# Patient Record
Sex: MALE | Race: OTHER | Employment: PART TIME | URBAN - METROPOLITAN AREA
[De-identification: names, ages, dates, MRNs, and addresses within clinical notes are randomized per-mention and may not be internally consistent; named-entity substitution may affect disease eponyms.]

---

## 2018-09-14 LAB
AVERAGE GLUCOSE: NORMAL
CHOLESTEROL, TOTAL: 167 MG/DL
CHOLESTEROL/HDL RATIO: NORMAL
CREATININE: 0.9 MG/DL
HBA1C MFR BLD: 5.4 %
HDLC SERPL-MCNC: 53 MG/DL (ref 35–70)
LDL CHOLESTEROL CALCULATED: 102 MG/DL (ref 0–160)
POTASSIUM (K+): 4.6
PROSTATE SPECIFIC ANTIGEN: 0.69 NG/ML
TRIGL SERPL-MCNC: 62 MG/DL
VLDLC SERPL CALC-MCNC: 12 MG/DL

## 2019-10-04 LAB
AVERAGE GLUCOSE: NORMAL
CHOLESTEROL, TOTAL: 180 MG/DL
CHOLESTEROL/HDL RATIO: NORMAL
CREATININE: 1 MG/DL
HBA1C MFR BLD: 5.4 %
HDLC SERPL-MCNC: 58 MG/DL (ref 35–70)
LDL CHOLESTEROL CALCULATED: 107 MG/DL (ref 0–160)
POTASSIUM (K+): 4.4
PROSTATE SPECIFIC ANTIGEN: 0.8 NG/ML
TRIGL SERPL-MCNC: 75 MG/DL
VLDLC SERPL CALC-MCNC: 15 MG/DL

## 2020-05-27 VITALS
HEART RATE: 58 BPM | HEIGHT: 66 IN | DIASTOLIC BLOOD PRESSURE: 64 MMHG | WEIGHT: 151 LBS | BODY MASS INDEX: 24.27 KG/M2 | SYSTOLIC BLOOD PRESSURE: 116 MMHG | RESPIRATION RATE: 14 BRPM

## 2020-05-27 RX ORDER — ROSUVASTATIN CALCIUM 5 MG/1
5 TABLET, COATED ORAL DAILY
COMMUNITY
End: 2021-01-12 | Stop reason: SDUPTHER

## 2020-05-27 RX ORDER — OMEPRAZOLE 40 MG/1
40 CAPSULE, DELAYED RELEASE ORAL DAILY
COMMUNITY
End: 2021-12-03 | Stop reason: SDUPTHER

## 2020-05-27 RX ORDER — TAMSULOSIN HYDROCHLORIDE 0.4 MG/1
0.4 CAPSULE ORAL DAILY
COMMUNITY
End: 2021-02-22 | Stop reason: SDUPTHER

## 2020-05-27 RX ORDER — ASPIRIN 81 MG/1
81 TABLET ORAL DAILY
COMMUNITY
End: 2021-01-20 | Stop reason: SDUPTHER

## 2020-10-21 ENCOUNTER — HOSPITAL ENCOUNTER (OUTPATIENT)
Age: 70
Discharge: HOME | End: 2020-10-23
Payer: COMMERCIAL

## 2020-10-21 LAB
ALBUMIN SERPL-MCNC: 4.2 G/DL (ref 3.5–5.2)
ALP BLD-CCNC: 88 U/L (ref 40–129)
ALT SERPL-CCNC: 21 U/L (ref 0–40)
ANION GAP SERPL CALCULATED.3IONS-SCNC: 10 MMOL/L (ref 7–16)
AST SERPL-CCNC: 26 U/L (ref 0–39)
BASOPHILS ABSOLUTE: 0.05 E9/L (ref 0–0.2)
BASOPHILS RELATIVE PERCENT: 1.1 % (ref 0–2)
BILIRUB SERPL-MCNC: 0.7 MG/DL (ref 0–1.2)
BUN BLDV-MCNC: 14 MG/DL (ref 8–23)
CALCIUM SERPL-MCNC: 9.8 MG/DL (ref 8.6–10.2)
CHLORIDE BLD-SCNC: 107 MMOL/L (ref 98–107)
CHOLESTEROL, TOTAL: 144 MG/DL (ref 0–199)
CO2: 26 MMOL/L (ref 22–29)
CREAT SERPL-MCNC: 1 MG/DL (ref 0.7–1.2)
EOSINOPHILS ABSOLUTE: 0.11 E9/L (ref 0.05–0.5)
EOSINOPHILS RELATIVE PERCENT: 2.4 % (ref 0–6)
GFR AFRICAN AMERICAN: >60
GFR NON-AFRICAN AMERICAN: >60 ML/MIN/1.73
GLUCOSE BLD-MCNC: 92 MG/DL (ref 74–99)
HBA1C MFR BLD: 5.2 % (ref 4–5.6)
HCT VFR BLD CALC: 44.4 % (ref 37–54)
HDLC SERPL-MCNC: 61 MG/DL
HEMOGLOBIN: 14.4 G/DL (ref 12.5–16.5)
IMMATURE GRANULOCYTES #: 0.01 E9/L
IMMATURE GRANULOCYTES %: 0.2 % (ref 0–5)
LDL CHOLESTEROL CALCULATED: 71 MG/DL (ref 0–99)
LYMPHOCYTES ABSOLUTE: 2 E9/L (ref 1.5–4)
LYMPHOCYTES RELATIVE PERCENT: 43.6 % (ref 20–42)
MCH RBC QN AUTO: 30.6 PG (ref 26–35)
MCHC RBC AUTO-ENTMCNC: 32.4 % (ref 32–34.5)
MCV RBC AUTO: 94.3 FL (ref 80–99.9)
MONOCYTES ABSOLUTE: 0.35 E9/L (ref 0.1–0.95)
MONOCYTES RELATIVE PERCENT: 7.6 % (ref 2–12)
NEUTROPHILS ABSOLUTE: 2.07 E9/L (ref 1.8–7.3)
NEUTROPHILS RELATIVE PERCENT: 45.1 % (ref 43–80)
PDW BLD-RTO: 12.8 FL (ref 11.5–15)
PLATELET # BLD: 177 E9/L (ref 130–450)
PMV BLD AUTO: 11.9 FL (ref 7–12)
POTASSIUM SERPL-SCNC: 5.4 MMOL/L (ref 3.5–5)
PROSTATE SPECIFIC ANTIGEN: 0.86 NG/ML (ref 0–4)
RBC # BLD: 4.71 E12/L (ref 3.8–5.8)
SODIUM BLD-SCNC: 143 MMOL/L (ref 132–146)
TOTAL PROTEIN: 6.7 G/DL (ref 6.4–8.3)
TRIGL SERPL-MCNC: 59 MG/DL (ref 0–149)
TSH SERPL DL<=0.05 MIU/L-ACNC: 3.96 UIU/ML (ref 0.27–4.2)
VLDLC SERPL CALC-MCNC: 12 MG/DL
WBC # BLD: 4.6 E9/L (ref 4.5–11.5)

## 2020-10-21 PROCEDURE — 80053 COMPREHEN METABOLIC PANEL: CPT

## 2020-10-21 PROCEDURE — 84443 ASSAY THYROID STIM HORMONE: CPT

## 2020-10-21 PROCEDURE — 36415 COLL VENOUS BLD VENIPUNCTURE: CPT

## 2020-10-21 PROCEDURE — G0103 PSA SCREENING: HCPCS

## 2020-10-21 PROCEDURE — 80061 LIPID PANEL: CPT

## 2020-10-21 PROCEDURE — 85025 COMPLETE CBC W/AUTO DIFF WBC: CPT

## 2020-10-21 PROCEDURE — 83036 HEMOGLOBIN GLYCOSYLATED A1C: CPT

## 2020-10-22 VITALS
RESPIRATION RATE: 14 BRPM | HEART RATE: 56 BPM | BODY MASS INDEX: 23.89 KG/M2 | DIASTOLIC BLOOD PRESSURE: 76 MMHG | TEMPERATURE: 96 F | SYSTOLIC BLOOD PRESSURE: 118 MMHG | WEIGHT: 148 LBS

## 2020-10-26 VITALS
BODY MASS INDEX: 23.78 KG/M2 | DIASTOLIC BLOOD PRESSURE: 76 MMHG | TEMPERATURE: 96 F | RESPIRATION RATE: 14 BRPM | HEART RATE: 56 BPM | HEIGHT: 66 IN | WEIGHT: 148 LBS | SYSTOLIC BLOOD PRESSURE: 118 MMHG

## 2021-01-12 RX ORDER — ROSUVASTATIN CALCIUM 5 MG/1
5 TABLET, COATED ORAL DAILY
Qty: 90 TABLET | Refills: 1 | Status: SHIPPED
Start: 2021-01-12 | End: 2021-06-21 | Stop reason: SDUPTHER

## 2021-01-21 RX ORDER — ASPIRIN 81 MG/1
81 TABLET ORAL DAILY
Qty: 90 TABLET | Refills: 3 | Status: SHIPPED | OUTPATIENT
Start: 2021-01-21

## 2021-02-22 RX ORDER — TAMSULOSIN HYDROCHLORIDE 0.4 MG/1
0.4 CAPSULE ORAL DAILY
Qty: 90 CAPSULE | Refills: 1 | Status: SHIPPED
Start: 2021-02-22 | End: 2021-06-21 | Stop reason: SDUPTHER

## 2021-06-21 RX ORDER — HYDROXYZINE HYDROCHLORIDE 25 MG/1
25 TABLET, FILM COATED ORAL EVERY 8 HOURS PRN
Qty: 60 TABLET | Refills: 0 | Status: SHIPPED | OUTPATIENT
Start: 2021-06-21 | End: 2021-07-21

## 2021-06-21 RX ORDER — ONDANSETRON 4 MG/1
4 TABLET, ORALLY DISINTEGRATING ORAL 3 TIMES DAILY PRN
Qty: 60 TABLET | Refills: 0 | Status: SHIPPED | OUTPATIENT
Start: 2021-06-21 | End: 2021-07-21

## 2021-06-21 RX ORDER — MELOXICAM 15 MG/1
15 TABLET ORAL DAILY
Qty: 90 TABLET | Refills: 1 | Status: SHIPPED
Start: 2021-06-21 | End: 2021-12-03 | Stop reason: SDUPTHER

## 2021-06-21 RX ORDER — TAMSULOSIN HYDROCHLORIDE 0.4 MG/1
0.4 CAPSULE ORAL DAILY
Qty: 90 CAPSULE | Refills: 1 | Status: SHIPPED
Start: 2021-06-21 | End: 2021-12-03 | Stop reason: SDUPTHER

## 2021-06-21 RX ORDER — ROSUVASTATIN CALCIUM 5 MG/1
5 TABLET, COATED ORAL DAILY
Qty: 90 TABLET | Refills: 1 | Status: SHIPPED
Start: 2021-06-21 | End: 2021-12-03 | Stop reason: SDUPTHER

## 2021-09-29 ENCOUNTER — TELEPHONE (OUTPATIENT)
Dept: FAMILY MEDICINE CLINIC | Age: 71
End: 2021-09-29

## 2021-09-29 DIAGNOSIS — E78.5 HYPERLIPIDEMIA, UNSPECIFIED HYPERLIPIDEMIA TYPE: Primary | ICD-10-CM

## 2021-09-29 DIAGNOSIS — Z86.2 HISTORY OF HENOCH-SCHONLEIN PURPURA: ICD-10-CM

## 2021-09-29 DIAGNOSIS — N40.1 BENIGN PROSTATIC HYPERPLASIA WITH LOWER URINARY TRACT SYMPTOMS, SYMPTOM DETAILS UNSPECIFIED: ICD-10-CM

## 2021-09-29 DIAGNOSIS — Z12.5 PROSTATE CANCER SCREENING: ICD-10-CM

## 2021-09-29 DIAGNOSIS — R53.83 OTHER FATIGUE: ICD-10-CM

## 2021-09-29 DIAGNOSIS — R73.9 HYPERGLYCEMIA: ICD-10-CM

## 2021-09-29 DIAGNOSIS — M15.9 GENERALIZED OSTEOARTHRITIS: ICD-10-CM

## 2021-09-29 DIAGNOSIS — K64.9 HEMORRHOIDS, UNSPECIFIED HEMORRHOID TYPE: ICD-10-CM

## 2021-12-03 ENCOUNTER — OFFICE VISIT (OUTPATIENT)
Dept: FAMILY MEDICINE CLINIC | Age: 71
End: 2021-12-03
Payer: COMMERCIAL

## 2021-12-03 VITALS
SYSTOLIC BLOOD PRESSURE: 116 MMHG | WEIGHT: 146 LBS | HEIGHT: 66 IN | TEMPERATURE: 96 F | OXYGEN SATURATION: 99 % | HEART RATE: 61 BPM | DIASTOLIC BLOOD PRESSURE: 76 MMHG | BODY MASS INDEX: 23.46 KG/M2

## 2021-12-03 DIAGNOSIS — M15.9 GENERALIZED OA: ICD-10-CM

## 2021-12-03 DIAGNOSIS — Z12.5 PROSTATE CANCER SCREENING: ICD-10-CM

## 2021-12-03 DIAGNOSIS — N40.0 BENIGN PROSTATIC HYPERPLASIA WITHOUT LOWER URINARY TRACT SYMPTOMS: ICD-10-CM

## 2021-12-03 DIAGNOSIS — R53.83 OTHER FATIGUE: ICD-10-CM

## 2021-12-03 DIAGNOSIS — K21.9 GASTROESOPHAGEAL REFLUX DISEASE WITHOUT ESOPHAGITIS: ICD-10-CM

## 2021-12-03 DIAGNOSIS — Z00.00 ANNUAL PHYSICAL EXAM: Primary | ICD-10-CM

## 2021-12-03 DIAGNOSIS — E55.9 VITAMIN D DEFICIENCY: ICD-10-CM

## 2021-12-03 DIAGNOSIS — E83.42 HYPOMAGNESEMIA: ICD-10-CM

## 2021-12-03 DIAGNOSIS — E78.5 HYPERLIPIDEMIA, UNSPECIFIED HYPERLIPIDEMIA TYPE: ICD-10-CM

## 2021-12-03 DIAGNOSIS — R73.9 HYPERGLYCEMIA: ICD-10-CM

## 2021-12-03 DIAGNOSIS — M17.12 OSTEOARTHRITIS OF LEFT KNEE, UNSPECIFIED OSTEOARTHRITIS TYPE: ICD-10-CM

## 2021-12-03 PROCEDURE — G8484 FLU IMMUNIZE NO ADMIN: HCPCS | Performed by: INTERNAL MEDICINE

## 2021-12-03 PROCEDURE — 99397 PER PM REEVAL EST PAT 65+ YR: CPT | Performed by: INTERNAL MEDICINE

## 2021-12-03 PROCEDURE — 93000 ELECTROCARDIOGRAM COMPLETE: CPT | Performed by: INTERNAL MEDICINE

## 2021-12-03 RX ORDER — OMEPRAZOLE 40 MG/1
40 CAPSULE, DELAYED RELEASE ORAL DAILY
Qty: 90 CAPSULE | Refills: 1 | Status: SHIPPED | OUTPATIENT
Start: 2021-12-03

## 2021-12-03 RX ORDER — MELOXICAM 15 MG/1
15 TABLET ORAL DAILY
Qty: 90 TABLET | Refills: 1 | Status: SHIPPED | OUTPATIENT
Start: 2021-12-03

## 2021-12-03 RX ORDER — ROSUVASTATIN CALCIUM 5 MG/1
5 TABLET, COATED ORAL DAILY
Qty: 90 TABLET | Refills: 1 | Status: SHIPPED | OUTPATIENT
Start: 2021-12-03 | End: 2022-05-13 | Stop reason: SDUPTHER

## 2021-12-03 RX ORDER — TAMSULOSIN HYDROCHLORIDE 0.4 MG/1
0.4 CAPSULE ORAL DAILY
Qty: 90 CAPSULE | Refills: 1 | Status: SHIPPED | OUTPATIENT
Start: 2021-12-03

## 2021-12-03 SDOH — ECONOMIC STABILITY: FOOD INSECURITY: WITHIN THE PAST 12 MONTHS, THE FOOD YOU BOUGHT JUST DIDN'T LAST AND YOU DIDN'T HAVE MONEY TO GET MORE.: NEVER TRUE

## 2021-12-03 SDOH — ECONOMIC STABILITY: FOOD INSECURITY: WITHIN THE PAST 12 MONTHS, YOU WORRIED THAT YOUR FOOD WOULD RUN OUT BEFORE YOU GOT MONEY TO BUY MORE.: NEVER TRUE

## 2021-12-03 ASSESSMENT — PATIENT HEALTH QUESTIONNAIRE - PHQ9
SUM OF ALL RESPONSES TO PHQ QUESTIONS 1-9: 0
SUM OF ALL RESPONSES TO PHQ9 QUESTIONS 1 & 2: 0
1. LITTLE INTEREST OR PLEASURE IN DOING THINGS: 0
SUM OF ALL RESPONSES TO PHQ QUESTIONS 1-9: 0
2. FEELING DOWN, DEPRESSED OR HOPELESS: 0
SUM OF ALL RESPONSES TO PHQ QUESTIONS 1-9: 0

## 2021-12-03 ASSESSMENT — SOCIAL DETERMINANTS OF HEALTH (SDOH): HOW HARD IS IT FOR YOU TO PAY FOR THE VERY BASICS LIKE FOOD, HOUSING, MEDICAL CARE, AND HEATING?: NOT HARD AT ALL

## 2021-12-03 NOTE — PROGRESS NOTES
Subjective:     Chief Complaint   Patient presents with    Annual Exam   Patient is here for and will examination    Complains of occasional flickering of the right upper eyelid lasting for 5 to 10 seconds, happens 2-3 times a day,  For the past 4 to 5 months, denies any other symptoms, he was seen by optometrist then also seen by ophthalmologist Dr. Errol Fleming he did not recommend any further testing, patient was told it could be a benign condition    Patient denies any tingling numbness on the face, denies any blurred vision, denies any tingling numbness on the arms or legs, denies any loss of balance, denies any headache or blurred vision,  Overall feels very well,    Complains of pain in the left knee he was walking up the hill it happened after that is getting better now,    Follow-up on the cholesterol and the BPH doing better with the Crestor and Flomax    Patient takes meloxicam on and off    History of Henoch-Schönlein purpura in 2018 with no recurrence, he was treated with prednisone by rheumatologist  Stress test August 2017 -    Past Medical History:   Diagnosis Date    Arthritis     GERD (gastroesophageal reflux disease)     Hemorrhoids     History of cardiovascular stress test 08/15/2017    Nuclear treadmill stress test    Hyperlipidemia     Irritable bowel syndrome     Plantar fasciitis         Social History     Socioeconomic History    Marital status:      Spouse name: Not on file    Number of children: Not on file    Years of education: Not on file    Highest education level: Not on file   Occupational History    Not on file   Tobacco Use    Smoking status: Never Smoker    Smokeless tobacco: Never Used   Substance and Sexual Activity    Alcohol use: No    Drug use: No    Sexual activity: Not on file   Other Topics Concern    Not on file   Social History Narrative    Not on file     Social Determinants of Health     Financial Resource Strain: Low Risk     Difficulty of Paying Living Expenses: Not hard at all   Food Insecurity: No Food Insecurity    Worried About [facility] in the Last Year: Never true    Ran Out of Food in the Last Year: Never true   Transportation Needs:     Lack of Transportation (Medical): Not on file    Lack of Transportation (Non-Medical): Not on file   Physical Activity:     Days of Exercise per Week: Not on file    Minutes of Exercise per Session: Not on file   Stress:     Feeling of Stress : Not on file   Social Connections:     Frequency of Communication with Friends and Family: Not on file    Frequency of Social Gatherings with Friends and Family: Not on file    Attends Hinduism Services: Not on file    Active Member of [address] [place] [address] or Organizations: Not on file    Attends Club or Organization Meetings: Not on file    Marital Status: Not on file   Intimate Partner Violence:     Fear of Current or Ex-Partner: Not on file    Emotionally Abused: Not on file    Physically Abused: Not on file    Sexually Abused: Not on file   Housing Stability:     Unable to Pay for Housing in the Last Year: Not on file    Number of [place] in the Last Year: Not on file    Unstable Housing in the Last Year: Not on file        Past Surgical History:   Procedure Laterality Date    ANUS SURGERY          Family History   Problem Relation Age of Onset    Cancer Father         melanoma     No Known Problems Brother         Allergies   Allergen Reactions    Asa [Aspirin]     Crestor [Rosuvastatin Calcium]     Iv Dye [Iodides]         ROS  No acute distress  Cardiac: Denies any chest pain or palpitation  Stress test 2017 -  He does stationary bike, and exercise 3-4 times a week without any angina or dyspnea, he can walk for half an hour without difficulty  He does elliptical for half an hour without difficult  Respiratory: Denies any cough or shortness of breath  CT chest August 2017 -ve  GI: No abdominal pain.  Denies any nausea vomiting or diarrhea  CT abdomen pelvis August 2017 -  Had colonoscopy with Dr. Coy Blunt 2020  : Denies any dysuria frequency or hematuria  Renal ultrasound October 2020 was negative  Neuro: No headache or dizziness  Chronic weakness right arm and right leg since childhood has been evaluated by different physicians  He was seen by neurosurgeon in [place] many years ago  Condition is stable  Endocrine: No diabetes  Skin: normal  No recent weight gain or weight loss  Denies any change in vision    Objective:    /76   Pulse 61   Temp 96 °F (35.6 °C)   Ht 5' 6\" (1.676 m)   Wt 146 lb (66.2 kg)   SpO2 99%   BMI 23.57 kg/m²     Constitutional: Alert awake and oriented  Eyes: Pupils equal bilaterally. Extraocular muscles intact  Neck: no JVD adenopathy no bruit  Heart:  RRR, no murmurs, gallops, or rubs.   Lungs:    no wheeze, rales or rhonchi  Abd: bowel sounds present, nontender, nondistended, no masses  Extrem:  No clubbing, cyanosis, or edema  Neuro: AAOx3,No Focal deficit  Pupils equal bilaterally  Extraocular muscles intact  Cranial nerves II through XII intact  No motor or sensory deficit except chronic weakness right arm and right leg  Gait normal,  Reflexes normal,      Psychological: no depression or anxiety   Left knee exam no localized tenderness  No swelling  No calf tenderness    Rectal exam prostate normal stool Hemoccult negative    Current Outpatient Medications   Medication Sig Dispense Refill    meloxicam (MOBIC) 15 MG tablet Take 1 tablet by mouth daily 90 tablet 1    rosuvastatin (CRESTOR) 5 MG tablet Take 1 tablet by mouth daily 90 tablet 1    tamsulosin (FLOMAX) 0.4 MG capsule Take 1 capsule by mouth daily 90 capsule 1    omeprazole (PRILOSEC) 40 MG delayed release capsule Take 1 capsule by mouth daily 90 capsule 1    aspirin 81 MG EC tablet Take 1 tablet by mouth daily 90 tablet 3    Coenzyme Q10 (CO Q10) 100 MG CAPS Take 200 mg by mouth daily       No current facility-administered medications for this visit.         Last 3 BMP  Lab Results   Component Value Date/Time     10/21/2020 07:30 AM     11/14/2017 12:55 PM     08/14/2017 10:26 PM    K 5.4 (H) 10/21/2020 07:30 AM    K 4.4 10/04/2019 12:00 AM    K 4.6 09/14/2018 12:00 AM    K 4.1 11/14/2017 12:55 PM    K 4.6 08/14/2017 10:26 PM     10/21/2020 07:30 AM    CL 98 11/14/2017 12:55 PM     08/14/2017 10:26 PM    CO2 26 10/21/2020 07:30 AM    CO2 29 11/14/2017 12:55 PM    CO2 30 (H) 08/14/2017 10:26 PM    BUN 14 10/21/2020 07:30 AM    BUN 11 11/14/2017 12:55 PM    BUN 16 08/14/2017 10:26 PM    CREATININE 1.0 10/21/2020 07:30 AM    CREATININE 1.0 10/04/2019 12:00 AM    CREATININE 0.9 09/14/2018 12:00 AM    CREATININE 1.0 11/14/2017 12:55 PM    CREATININE 1.0 08/14/2017 10:26 PM    GLUCOSE 92 10/21/2020 07:30 AM    GLUCOSE 88 11/14/2017 12:55 PM    GLUCOSE 118 (H) 08/14/2017 10:26 PM    GLUCOSE 86 11/19/2010 09:23 AM    CALCIUM 9.8 10/21/2020 07:30 AM    CALCIUM 10.1 11/14/2017 12:55 PM    CALCIUM 9.3 08/14/2017 10:26 PM       Last 3 CMP:    Lab Results   Component Value Date/Time     10/21/2020 07:30 AM     11/14/2017 12:55 PM     08/14/2017 10:26 PM    K 5.4 (H) 10/21/2020 07:30 AM    K 4.4 10/04/2019 12:00 AM    K 4.6 09/14/2018 12:00 AM    K 4.1 11/14/2017 12:55 PM    K 4.6 08/14/2017 10:26 PM     10/21/2020 07:30 AM    CL 98 11/14/2017 12:55 PM     08/14/2017 10:26 PM    CO2 26 10/21/2020 07:30 AM    CO2 29 11/14/2017 12:55 PM    CO2 30 (H) 08/14/2017 10:26 PM    BUN 14 10/21/2020 07:30 AM    BUN 11 11/14/2017 12:55 PM    BUN 16 08/14/2017 10:26 PM    CREATININE 1.0 10/21/2020 07:30 AM    CREATININE 1.0 10/04/2019 12:00 AM    CREATININE 0.9 09/14/2018 12:00 AM    CREATININE 1.0 11/14/2017 12:55 PM    CREATININE 1.0 08/14/2017 10:26 PM    GLUCOSE 92 10/21/2020 07:30 AM    GLUCOSE 88 11/14/2017 12:55 PM    GLUCOSE 118 (H) 08/14/2017 10:26 PM    GLUCOSE 86 11/19/2010 09:23 AM    CALCIUM 9.8 10/21/2020 07:30 AM    CALCIUM 10.1 11/14/2017 12:55 PM    CALCIUM 9.3 08/14/2017 10:26 PM    PROT 6.7 10/21/2020 07:30 AM    PROT 8.2 11/14/2017 12:55 PM    PROT 6.5 08/14/2017 10:26 PM    LABALBU 4.2 10/21/2020 07:30 AM    LABALBU 4.7 11/14/2017 12:55 PM    LABALBU 4.0 08/14/2017 10:26 PM    LABALBU 4.0 11/19/2010 09:23 AM    BILITOT 0.7 10/21/2020 07:30 AM    BILITOT 0.9 11/14/2017 12:55 PM    BILITOT 0.5 08/14/2017 10:26 PM    ALKPHOS 88 10/21/2020 07:30 AM    ALKPHOS 106 11/14/2017 12:55 PM    ALKPHOS 81 08/14/2017 10:26 PM    AST 26 10/21/2020 07:30 AM    AST 28 11/14/2017 12:55 PM    AST 24 08/14/2017 10:26 PM    ALT 21 10/21/2020 07:30 AM    ALT 20 11/14/2017 12:55 PM    ALT 19 08/14/2017 10:26 PM        CBC:   Lab Results   Component Value Date/Time    WBC 4.6 10/21/2020 07:30 AM    RBC 4.71 10/21/2020 07:30 AM    HGB 14.4 10/21/2020 07:30 AM    HCT 44.4 10/21/2020 07:30 AM    MCV 94.3 10/21/2020 07:30 AM    MCH 30.6 10/21/2020 07:30 AM    MCHC 32.4 10/21/2020 07:30 AM    RDW 12.8 10/21/2020 07:30 AM     10/21/2020 07:30 AM    MPV 11.9 10/21/2020 07:30 AM       A1C:  Lab Results   Component Value Date/Time    LABA1C 5.2 10/21/2020 07:30 AM       Lipid panel:  Lab Results   Component Value Date    CHOL 144 10/21/2020    CHOL 180 10/04/2019    CHOL 167 09/14/2018    TRIG 59 10/21/2020    TRIG 75 10/04/2019    TRIG 62 09/14/2018    HDL 61 10/21/2020    HDL 58 10/04/2019    HDL 53 09/14/2018        Lab Results   Component Value Date/Time    PROT 6.7 10/21/2020 07:30 AM    PROT 8.2 11/14/2017 12:55 PM    PROT 6.5 08/14/2017 10:26 PM    INR 0.9 11/14/2017 12:55 PM       No results found for: MG      Assessment. Sarkis Ravi was seen today for annual exam.    Diagnoses and all orders for this visit:    Annual physical exam  -     EKG 12 lead; Future  -     EKG 12 lead    Hyperlipidemia, unspecified hyperlipidemia type  -     COMPREHENSIVE METABOLIC PANEL; Future  -     LIPID PANEL;  Future    Benign prostatic hyperplasia without lower urinary tract symptoms    Gastroesophageal reflux disease without esophagitis    Hypomagnesemia  -     MAGNESIUM; Future    Vitamin D deficiency  -     Vitamin D 25 Hydroxy; Future    Generalized OA  -     CBC WITH AUTO DIFFERENTIAL; Future    Other fatigue  -     TSH; Future    Hyperglycemia  -     HEMOGLOBIN A1C; Future    Prostate cancer screening  -     PSA SCREENING; Future    Osteoarthritis of left knee, unspecified osteoarthritis type    Other orders  -     meloxicam (MOBIC) 15 MG tablet; Take 1 tablet by mouth daily  -     rosuvastatin (CRESTOR) 5 MG tablet; Take 1 tablet by mouth daily  -     tamsulosin (FLOMAX) 0.4 MG capsule; Take 1 capsule by mouth daily  -     omeprazole (PRILOSEC) 40 MG delayed release capsule; Take 1 capsule by mouth daily       Patient Active Problem List   Diagnosis    Hyperlipidemia    Benign prostatic hyperplasia without lower urinary tract symptoms    Gastroesophageal reflux disease without esophagitis    Generalized OA       Plan: Annual physical exam clinically doing well    Flickering on the right upper eyelid etiology not clear, I offered MRI of the brain, patient will wait on that and let me know if no improvement, do complete blood work    Hyperlipidemia continue Crestor 5 mg check CMP and lipid profile      BPH asymptomatic doing well continue Flomax 0.4 mg he is doing much better  Check PSA    GE reflux disease Prilosec as needed      Osteoarthritis left knee monitor x-ray if no improvement    Check CBC, CMP, lipid profile, thyroid profile, A1c, B12 level, PSA    He will do the blood work and call for report    I will be retiring he will see another physician next visit    With his family history of colon cancer his brother was recently found to have colon cancer patient should consider colonoscopy within 3 to 5 years  He will call Dr. Zbigniew Barnard and schedule accordingly                No follow-ups on file.        Cristel Rucker MD  2:42 PM  12/3/2021 [place]

## 2022-01-06 DIAGNOSIS — E83.42 HYPOMAGNESEMIA: ICD-10-CM

## 2022-01-06 DIAGNOSIS — Z12.5 PROSTATE CANCER SCREENING: ICD-10-CM

## 2022-01-06 DIAGNOSIS — E78.5 HYPERLIPIDEMIA, UNSPECIFIED HYPERLIPIDEMIA TYPE: ICD-10-CM

## 2022-01-06 DIAGNOSIS — R53.83 OTHER FATIGUE: ICD-10-CM

## 2022-01-06 DIAGNOSIS — M15.9 GENERALIZED OA: ICD-10-CM

## 2022-01-06 DIAGNOSIS — E55.9 VITAMIN D DEFICIENCY: ICD-10-CM

## 2022-01-06 DIAGNOSIS — R73.9 HYPERGLYCEMIA: ICD-10-CM

## 2022-01-06 LAB
ALBUMIN SERPL-MCNC: 4.2 G/DL (ref 3.5–5.2)
ALP BLD-CCNC: 99 U/L (ref 40–129)
ALT SERPL-CCNC: 21 U/L (ref 0–40)
ANION GAP SERPL CALCULATED.3IONS-SCNC: 13 MMOL/L (ref 7–16)
AST SERPL-CCNC: 29 U/L (ref 0–39)
BASOPHILS ABSOLUTE: 0.04 E9/L (ref 0–0.2)
BASOPHILS RELATIVE PERCENT: 0.8 % (ref 0–2)
BILIRUB SERPL-MCNC: 0.5 MG/DL (ref 0–1.2)
BUN BLDV-MCNC: 12 MG/DL (ref 6–23)
CALCIUM SERPL-MCNC: 10 MG/DL (ref 8.6–10.2)
CHLORIDE BLD-SCNC: 105 MMOL/L (ref 98–107)
CHOLESTEROL, TOTAL: 139 MG/DL (ref 0–199)
CO2: 25 MMOL/L (ref 22–29)
CREAT SERPL-MCNC: 1.1 MG/DL (ref 0.7–1.2)
EOSINOPHILS ABSOLUTE: 0.12 E9/L (ref 0.05–0.5)
EOSINOPHILS RELATIVE PERCENT: 2.5 % (ref 0–6)
GFR AFRICAN AMERICAN: >60
GFR NON-AFRICAN AMERICAN: >60 ML/MIN/1.73
GLUCOSE BLD-MCNC: 93 MG/DL (ref 74–99)
HBA1C MFR BLD: 5.4 % (ref 4–5.6)
HCT VFR BLD CALC: 43.3 % (ref 37–54)
HDLC SERPL-MCNC: 69 MG/DL
HEMOGLOBIN: 14.3 G/DL (ref 12.5–16.5)
IMMATURE GRANULOCYTES #: 0.01 E9/L
IMMATURE GRANULOCYTES %: 0.2 % (ref 0–5)
LDL CHOLESTEROL CALCULATED: 59 MG/DL (ref 0–99)
LYMPHOCYTES ABSOLUTE: 1.83 E9/L (ref 1.5–4)
LYMPHOCYTES RELATIVE PERCENT: 38.9 % (ref 20–42)
MAGNESIUM: 2.3 MG/DL (ref 1.6–2.6)
MCH RBC QN AUTO: 30.7 PG (ref 26–35)
MCHC RBC AUTO-ENTMCNC: 33 % (ref 32–34.5)
MCV RBC AUTO: 92.9 FL (ref 80–99.9)
MONOCYTES ABSOLUTE: 0.49 E9/L (ref 0.1–0.95)
MONOCYTES RELATIVE PERCENT: 10.4 % (ref 2–12)
NEUTROPHILS ABSOLUTE: 2.22 E9/L (ref 1.8–7.3)
NEUTROPHILS RELATIVE PERCENT: 47.2 % (ref 43–80)
PDW BLD-RTO: 12.7 FL (ref 11.5–15)
PLATELET # BLD: 196 E9/L (ref 130–450)
PMV BLD AUTO: 11.8 FL (ref 7–12)
POTASSIUM SERPL-SCNC: 4.9 MMOL/L (ref 3.5–5)
PROSTATE SPECIFIC ANTIGEN: 0.75 NG/ML (ref 0–4)
RBC # BLD: 4.66 E12/L (ref 3.8–5.8)
SODIUM BLD-SCNC: 143 MMOL/L (ref 132–146)
TOTAL PROTEIN: 6.9 G/DL (ref 6.4–8.3)
TRIGL SERPL-MCNC: 56 MG/DL (ref 0–149)
TSH SERPL DL<=0.05 MIU/L-ACNC: 2.86 UIU/ML (ref 0.27–4.2)
VITAMIN D 25-HYDROXY: 47 NG/ML (ref 30–100)
VLDLC SERPL CALC-MCNC: 11 MG/DL
WBC # BLD: 4.7 E9/L (ref 4.5–11.5)

## 2022-01-12 RX ORDER — ONDANSETRON 4 MG/1
4 TABLET, ORALLY DISINTEGRATING ORAL 3 TIMES DAILY PRN
Qty: 60 TABLET | Refills: 0 | OUTPATIENT
Start: 2022-01-12 | End: 2022-02-11

## 2022-05-13 RX ORDER — ROSUVASTATIN CALCIUM 5 MG/1
5 TABLET, COATED ORAL DAILY
Qty: 90 TABLET | Refills: 1 | Status: SHIPPED | OUTPATIENT
Start: 2022-05-13